# Patient Record
Sex: FEMALE | Race: BLACK OR AFRICAN AMERICAN | NOT HISPANIC OR LATINO
[De-identification: names, ages, dates, MRNs, and addresses within clinical notes are randomized per-mention and may not be internally consistent; named-entity substitution may affect disease eponyms.]

---

## 2017-06-22 ENCOUNTER — HOSPITAL (OUTPATIENT)
Dept: OTHER | Age: 47
End: 2017-06-22

## 2020-08-14 ENCOUNTER — OFFICE VISIT (OUTPATIENT)
Dept: URBAN - METROPOLITAN AREA SURGERY CENTER 31 | Facility: SURGERY CENTER | Age: 50
End: 2020-08-14
Payer: MEDICAID

## 2020-08-14 DIAGNOSIS — Z12.11 COLON CANCER SCREENING: ICD-10-CM

## 2020-08-14 DIAGNOSIS — K63.89 BACTERIAL OVERGROWTH SYNDROME: ICD-10-CM

## 2020-08-14 DIAGNOSIS — D12.2 ADENOMA OF ASCENDING COLON: ICD-10-CM

## 2020-08-14 PROCEDURE — 45385 COLONOSCOPY W/LESION REMOVAL: CPT | Performed by: INTERNAL MEDICINE

## 2020-08-14 PROCEDURE — G8907 PT DOC NO EVENTS ON DISCHARG: HCPCS | Performed by: INTERNAL MEDICINE

## 2020-08-14 PROCEDURE — G9933 CANC DETECTD DURING COL SCRN: HCPCS | Performed by: INTERNAL MEDICINE

## 2021-07-13 ENCOUNTER — OFFICE VISIT (OUTPATIENT)
Dept: URBAN - METROPOLITAN AREA CLINIC 128 | Facility: CLINIC | Age: 51
End: 2021-07-13
Payer: MEDICAID

## 2021-07-13 ENCOUNTER — WEB ENCOUNTER (OUTPATIENT)
Dept: URBAN - METROPOLITAN AREA CLINIC 128 | Facility: CLINIC | Age: 51
End: 2021-07-13

## 2021-07-13 DIAGNOSIS — R10.32 LLQ ABDOMINAL PAIN: ICD-10-CM

## 2021-07-13 DIAGNOSIS — K59.01 CONSTIPATION BY DELAYED COLONIC TRANSIT: ICD-10-CM

## 2021-07-13 DIAGNOSIS — R10.12 LUQ ABDOMINAL PAIN: ICD-10-CM

## 2021-07-13 PROCEDURE — 99213 OFFICE O/P EST LOW 20 MIN: CPT | Performed by: INTERNAL MEDICINE

## 2021-07-13 RX ORDER — LINACLOTIDE 290 UG/1
1 CAPSULE AT LEAST 30 MINUTES BEFORE THE FIRST MEAL OF THE DAY ON AN EMPTY STOMACH CAPSULE, GELATIN COATED ORAL ONCE A DAY
Qty: 90 | Refills: 3 | OUTPATIENT
Start: 2021-07-13 | End: 2022-07-08

## 2021-07-13 RX ORDER — POLYETHYLENE GLYCOL 3350 17 G/17G
17GRAMS POWDER, FOR SOLUTION ORAL
Qty: 2880 GRAMS | Refills: 3 | OUTPATIENT
Start: 2021-07-13 | End: 2022-07-08

## 2021-07-13 RX ORDER — MELOXICAM 15 MG/1
1 TABLET TABLET ORAL ONCE A DAY
Status: ACTIVE | COMMUNITY

## 2021-07-13 NOTE — HPI-TODAY'S VISIT:
Pleasant 49yo woman here for evaluation of a 3 month history of recurrent daily LUQ discomfort that feels like a lump in the abdomen.  It does not prevent any desired activity or p.o. intake.  Not assoc with body position or p.o. intake.  The discomfort usually moves into the LLQ over 2-3 hours and can resolve with bowel movement or flatus.  No GI bleeding.  Colonoscopy 08/2020 positive for diverticulosis and a 3mm SSA in the cecum.  Hx of gastric sleeve surgery and repair of hiatal hernia at the same time.  No new medications.  There is constipation with hard small volume BMs with straining.  She takes Mg Citrate 1-2 times a day to assist with BMs.

## 2021-07-13 NOTE — PHYSICAL EXAM GASTROINTESTINAL
Abdomen soft, obese, nontender at this time, nondistended, no masses palpable , normal bowel sounds, no masses, no hernias, port site scars   Liver and Spleen , no hepatomegaly present, liver edge nontender , spleen not palpable   Rectal: deferred

## 2021-07-27 ENCOUNTER — OFFICE VISIT (OUTPATIENT)
Dept: URBAN - METROPOLITAN AREA CLINIC 19 | Facility: CLINIC | Age: 51
End: 2021-07-27

## 2021-09-21 ENCOUNTER — OFFICE VISIT (OUTPATIENT)
Dept: URBAN - METROPOLITAN AREA CLINIC 128 | Facility: CLINIC | Age: 51
End: 2021-09-21
Payer: MEDICAID

## 2021-09-21 DIAGNOSIS — K59.01 CONSTIPATION BY DELAYED COLONIC TRANSIT: ICD-10-CM

## 2021-09-21 DIAGNOSIS — Z86.010 PERSONAL HISTORY OF COLONIC POLYPS: ICD-10-CM

## 2021-09-21 DIAGNOSIS — R10.12 LUQ ABDOMINAL PAIN: ICD-10-CM

## 2021-09-21 PROBLEM — 35298007: Status: ACTIVE | Noted: 2021-07-13

## 2021-09-21 PROBLEM — 428283002: Status: ACTIVE | Noted: 2021-09-21

## 2021-09-21 PROCEDURE — 99213 OFFICE O/P EST LOW 20 MIN: CPT | Performed by: INTERNAL MEDICINE

## 2021-09-21 RX ORDER — POLYETHYLENE GLYCOL 3350 17 G/17G
17GRAMS POWDER, FOR SOLUTION ORAL
Qty: 2880 GRAMS | Refills: 3 | Status: ON HOLD | COMMUNITY
Start: 2021-07-13 | End: 2022-07-08

## 2021-09-21 RX ORDER — MELOXICAM 15 MG/1
1 TABLET TABLET ORAL ONCE A DAY
Status: DISCONTINUED | COMMUNITY

## 2021-09-21 RX ORDER — LINACLOTIDE 290 UG/1
1 CAPSULE AT LEAST 30 MINUTES BEFORE THE FIRST MEAL OF THE DAY ON AN EMPTY STOMACH CAPSULE, GELATIN COATED ORAL ONCE A DAY
Qty: 90 | Refills: 3 | Status: ON HOLD | COMMUNITY
Start: 2021-07-13 | End: 2022-07-08

## 2021-09-21 NOTE — HPI-TODAY'S VISIT:
Mrs. Villareal returns offering that linzess samples worked very well but amitiza samples did not.  She is using Miralax as well.  There has been confusion regarding approval of her Linzess.  She offers that it was approved by insurance but her pharmacy told her we had not approved it.  I think she meant we need to fill out prior approval forms.   Regardless, she noted significant improvement in her LUQ and LLQ pains when using linzess and having regular BMs.  Miralax alone does not seem to keep her regular.  She has lost 6lbs with diet and exercise.  Gastric sleeve was in 2014.  She originally lost 70lbs but regained 80lbs when her parents passed away two months apart.  No GERD symptoms.   Some spells of queasiness.

## 2021-09-21 NOTE — PHYSICAL EXAM GASTROINTESTINAL
Abdomen soft, obese, minimally tender in the LLQ, nondistended, no masses palpable , port site scars, normal bowel sounds   Liver and Spleen , no hepatomegaly present, liver edge nontender , spleen not palpable   Rectal: deferred

## 2021-12-14 ENCOUNTER — DASHBOARD ENCOUNTERS (OUTPATIENT)
Age: 51
End: 2021-12-14

## 2021-12-14 ENCOUNTER — OFFICE VISIT (OUTPATIENT)
Dept: URBAN - METROPOLITAN AREA CLINIC 128 | Facility: CLINIC | Age: 51
End: 2021-12-14

## 2021-12-14 RX ORDER — LINACLOTIDE 290 UG/1
1 CAPSULE AT LEAST 30 MINUTES BEFORE THE FIRST MEAL OF THE DAY ON AN EMPTY STOMACH CAPSULE, GELATIN COATED ORAL ONCE A DAY
Qty: 90 | Refills: 3 | Status: ON HOLD | COMMUNITY
Start: 2021-07-13 | End: 2022-07-08

## 2021-12-14 RX ORDER — POLYETHYLENE GLYCOL 3350 17 G/17G
17GRAMS POWDER, FOR SOLUTION ORAL
Qty: 2880 GRAMS | Refills: 3 | Status: ON HOLD | COMMUNITY
Start: 2021-07-13 | End: 2022-07-08